# Patient Record
Sex: MALE | Race: WHITE | NOT HISPANIC OR LATINO | URBAN - METROPOLITAN AREA
[De-identification: names, ages, dates, MRNs, and addresses within clinical notes are randomized per-mention and may not be internally consistent; named-entity substitution may affect disease eponyms.]

---

## 2019-04-30 ENCOUNTER — IMPORTED ENCOUNTER (OUTPATIENT)
Dept: URBAN - METROPOLITAN AREA CLINIC 50 | Facility: CLINIC | Age: 59
End: 2019-04-30

## 2019-08-27 ENCOUNTER — IMPORTED ENCOUNTER (OUTPATIENT)
Dept: URBAN - METROPOLITAN AREA CLINIC 50 | Facility: CLINIC | Age: 59
End: 2019-08-27

## 2019-08-27 NOTE — PATIENT DISCUSSION
"""IOP stable. Poor compliance. No gtt this am. Lasat gtt was yesterday. Continue Cosopt BID OU.  ""

## 2019-09-16 ENCOUNTER — IMPORTED ENCOUNTER (OUTPATIENT)
Dept: URBAN - METROPOLITAN AREA CLINIC 50 | Facility: CLINIC | Age: 59
End: 2019-09-16

## 2019-12-17 ENCOUNTER — IMPORTED ENCOUNTER (OUTPATIENT)
Dept: URBAN - METROPOLITAN AREA CLINIC 50 | Facility: CLINIC | Age: 59
End: 2019-12-17

## 2020-04-14 ENCOUNTER — IMPORTED ENCOUNTER (OUTPATIENT)
Dept: URBAN - METROPOLITAN AREA CLINIC 50 | Facility: CLINIC | Age: 60
End: 2020-04-14

## 2020-04-16 ENCOUNTER — IMPORTED ENCOUNTER (OUTPATIENT)
Dept: URBAN - METROPOLITAN AREA CLINIC 50 | Facility: CLINIC | Age: 60
End: 2020-04-16

## 2020-11-17 ENCOUNTER — IMPORTED ENCOUNTER (OUTPATIENT)
Dept: URBAN - METROPOLITAN AREA CLINIC 50 | Facility: CLINIC | Age: 60
End: 2020-11-17

## 2020-11-17 NOTE — PATIENT DISCUSSION
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 Mild diabetic retinopathy found.  Bilateral macular ""

## 2020-11-19 ENCOUNTER — IMPORTED ENCOUNTER (OUTPATIENT)
Dept: URBAN - METROPOLITAN AREA CLINIC 50 | Facility: CLINIC | Age: 60
End: 2020-11-19

## 2021-01-19 NOTE — PATIENT DISCUSSION
CHALAZION LLL AND RUL: PRESCRIBED WARM COMPRESSES, EYELID SCRUBS AND DOXYCYCLINE 100MG BID PO X 2 WEEKS AND TOBRADEX WINIFRED QHS X 2 WEEKS.

## 2021-04-17 ASSESSMENT — TONOMETRY
OD_IOP_MMHG: 13
OD_IOP_MMHG: 18
OD_IOP_MMHG: 16
OS_IOP_MMHG: 8
OS_IOP_MMHG: 19
OS_IOP_MMHG: 12
OD_IOP_MMHG: 22
OD_IOP_MMHG: 16
OS_IOP_MMHG: 17
OS_IOP_MMHG: 17
OD_IOP_MMHG: 9
OS_IOP_MMHG: 23
OS_IOP_MMHG: 16
OS_IOP_MMHG: 13
OD_IOP_MMHG: 12
OD_IOP_MMHG: 20

## 2021-04-17 ASSESSMENT — VISUAL ACUITY
OD_SC: 20/50
OD_CC: J2@ 16 IN
OD_SC: 20/200
OD_PH: 20/30
OS_BAT: 20/60
OS_OTHER: 20/60. 20/200.
OD_CC: J1
OD_PH: 20/40
OS_BAT: 20/60
OD_PH: 20/40
OD_PH: @ 16 IN
OS_PH: 20/50
OS_SC: 20/200
OD_SC: 20/200
OD_PH: 20/80
OD_OTHER: 20/60. 20/70.
OS_OTHER: 20/60. >20/400.
OS_CC: 20/100
OS_PH: @ 16 IN
OS_CC: 20/70
OD_CC: 20/70
OS_CC: 20/40
OS_CC: J1
OD_CC: 20/100
OD_BAT: 20/40
OS_CC: J2@ 16 IN
OS_PH: 20/25-1
OD_CC: 20/60
OS_SC: 20/200
OD_OTHER: 20/40. 20/80.
OS_SC: 20/200
OD_BAT: 20/60
OS_PH: 20/40

## 2021-04-17 ASSESSMENT — PACHYMETRY
OS_CT_UM: 645
OS_CT_UM: 645
OD_CT_UM: 633
OD_CT_UM: 633
OS_CT_UM: 645
OD_CT_UM: 633
OS_CT_UM: 645
OD_CT_UM: 633
OS_CT_UM: 645

## 2021-10-12 ENCOUNTER — COMPREHENSIVE EXAM (OUTPATIENT)
Dept: URBAN - METROPOLITAN AREA CLINIC 50 | Facility: CLINIC | Age: 61
End: 2021-10-12

## 2021-10-12 DIAGNOSIS — H40.013: ICD-10-CM

## 2021-10-12 DIAGNOSIS — H25.813: ICD-10-CM

## 2021-10-12 DIAGNOSIS — E11.3293: ICD-10-CM

## 2021-10-12 DIAGNOSIS — H52.13: ICD-10-CM

## 2021-10-12 DIAGNOSIS — Z79.4: ICD-10-CM

## 2021-10-12 PROCEDURE — 92014 COMPRE OPH EXAM EST PT 1/>: CPT

## 2021-10-12 PROCEDURE — 92015 DETERMINE REFRACTIVE STATE: CPT

## 2021-10-12 PROCEDURE — 92134 CPTRZ OPH DX IMG PST SGM RTA: CPT

## 2021-10-12 RX ORDER — DORZOLAMIDE HYDROCHLORIDE TIMOLOL MALEATE 20; 5 MG/ML; MG/ML
1 SOLUTION/ DROPS OPHTHALMIC TWICE A DAY
Start: 2021-10-12

## 2021-10-12 ASSESSMENT — TONOMETRY
OD_IOP_MMHG: 23
OS_IOP_MMHG: 23
OS_IOP_MMHG: 16
OD_IOP_MMHG: 17

## 2021-10-12 ASSESSMENT — VISUAL ACUITY
OD_GLARE: 20/40
OD_GLARE: 20/80
OS_GLARE: 20/200
OS_PH: 20/50
OU_SC: 20/60-1
OS_SC: 20/200
OD_PH: 20/40
OD_SC: 20/70
OS_GLARE: 20/60

## 2022-02-15 ENCOUNTER — FOLLOW UP (OUTPATIENT)
Dept: URBAN - METROPOLITAN AREA CLINIC 50 | Facility: CLINIC | Age: 62
End: 2022-02-15

## 2022-02-15 DIAGNOSIS — H25.813: ICD-10-CM

## 2022-02-15 DIAGNOSIS — H40.013: ICD-10-CM

## 2022-02-15 PROCEDURE — 92012 INTRM OPH EXAM EST PATIENT: CPT

## 2022-02-15 ASSESSMENT — TONOMETRY
OD_IOP_MMHG: 24
OS_IOP_MMHG: 17
OD_IOP_MMHG: 18
OS_IOP_MMHG: 24

## 2022-02-15 ASSESSMENT — VISUAL ACUITY
OS_PH: 20/50
OD_SC: 20/30
OS_SC: 20/70

## 2022-06-16 ENCOUNTER — FOLLOW UP (OUTPATIENT)
Dept: URBAN - METROPOLITAN AREA CLINIC 50 | Facility: CLINIC | Age: 62
End: 2022-06-16

## 2022-06-16 DIAGNOSIS — H40.053: ICD-10-CM

## 2022-06-16 PROCEDURE — 92012 INTRM OPH EXAM EST PATIENT: CPT

## 2022-06-16 ASSESSMENT — VISUAL ACUITY
OD_PH: 20/40-2
OS_PH: 20/30
OD_CC: 20/50
OD_SC: 20/70
OU_SC: 20/60
OS_SC: 20/100

## 2022-06-16 ASSESSMENT — TONOMETRY
OD_IOP_MMHG: 21
OS_IOP_MMHG: 13
OD_IOP_MMHG: 15
OS_IOP_MMHG: 20

## 2022-06-16 NOTE — PATIENT DISCUSSION
Stable, IOP 15/13 Adjusted. No drop therapy needed at this time. Will see patient back in 6 months for DFE. Will order HVF 24-2 ANGELA standard and OCT (RNFL) to be completed in 6 months. Will go over testing results at next appointment. Will continue to monitor.